# Patient Record
Sex: FEMALE | Race: WHITE | NOT HISPANIC OR LATINO | URBAN - METROPOLITAN AREA
[De-identification: names, ages, dates, MRNs, and addresses within clinical notes are randomized per-mention and may not be internally consistent; named-entity substitution may affect disease eponyms.]

---

## 2017-01-18 ENCOUNTER — APPOINTMENT (RX ONLY)
Dept: URBAN - METROPOLITAN AREA CLINIC 167 | Facility: CLINIC | Age: 52
Setting detail: DERMATOLOGY
End: 2017-01-18

## 2017-01-18 DIAGNOSIS — Z41.9 ENCOUNTER FOR PROCEDURE FOR PURPOSES OTHER THAN REMEDYING HEALTH STATE, UNSPECIFIED: ICD-10-CM

## 2017-01-18 NOTE — HPI: OTHER
Condition:: Filler/btx-a tx
Please Describe Your Condition:: No known contraindications to soft tissue augmentation with CORRINE; no known contraindications to treatment with botulinum toxin. See \"Cosmetic Procedure\" note in Attachments.

## 2017-04-19 ENCOUNTER — APPOINTMENT (RX ONLY)
Dept: URBAN - METROPOLITAN AREA CLINIC 167 | Facility: CLINIC | Age: 52
Setting detail: DERMATOLOGY
End: 2017-04-19

## 2017-04-19 DIAGNOSIS — Z41.9 ENCOUNTER FOR PROCEDURE FOR PURPOSES OTHER THAN REMEDYING HEALTH STATE, UNSPECIFIED: ICD-10-CM

## 2017-07-18 ENCOUNTER — APPOINTMENT (RX ONLY)
Dept: URBAN - METROPOLITAN AREA CLINIC 170 | Facility: CLINIC | Age: 52
Setting detail: DERMATOLOGY
End: 2017-07-18

## 2017-07-18 DIAGNOSIS — Z41.9 ENCOUNTER FOR PROCEDURE FOR PURPOSES OTHER THAN REMEDYING HEALTH STATE, UNSPECIFIED: ICD-10-CM

## 2017-07-18 PROBLEM — E05.90 THYROTOXICOSIS, UNSPECIFIED WITHOUT THYROTOXIC CRISIS OR STORM: Status: ACTIVE | Noted: 2017-07-18

## 2017-07-18 PROCEDURE — ? DYSPORT

## 2017-07-18 PROCEDURE — ? FILLERS

## 2017-07-18 NOTE — PROCEDURE: FILLERS
Dorsal Hands Filler  Volume In Cc: 0
Consent: Written consent obtained. Risks include but not limited to bruising, beading, irregular texture, ulceration, infection, allergic reaction, scar formation, incomplete augmentation, temporary nature, procedural pain.
Additional Area 1 Volume In Cc: 1
Map Statment: See Attach Map for Complete Details
Anesthesia Volume In Cc: 0.5
Post-Care Instructions: Patient instructed to apply ice to reduce swelling.
Additional Area 1 Location: Face
Use Map Statement For Sites (Optional): No
Filler: Juvederm Ultra XC
Expiration Date (Month Year): 08/19
Lot #: [id number]
Detail Level: Zone
Filler: Restylane-L
Lot #: [id number]
Expiration Date (Month Year): 09/18
Additional Anesthesia Volume In Cc: 6
Anesthesia Type: 1% lidocaine without epinephrine

## 2017-07-18 NOTE — PROCEDURE: DYSPORT
Glabellar Complex Units: 0
Consent: Written consent obtained. Risks include but not limited to lid/brow ptosis, bruising, swelling, diplopia, temporary effect, incomplete chemical denervation.
Dilution (U/ 0.1cc): 10
Expiration Date (Month Year): 10/17
Detail Level: Zone
Additional Area 1 Units: 135
Lot #: [id number]
Additional Area 1 Location: Face and neck bands

## 2017-10-10 ENCOUNTER — APPOINTMENT (RX ONLY)
Dept: URBAN - METROPOLITAN AREA CLINIC 170 | Facility: CLINIC | Age: 52
Setting detail: DERMATOLOGY
End: 2017-10-10

## 2017-10-10 DIAGNOSIS — Z41.9 ENCOUNTER FOR PROCEDURE FOR PURPOSES OTHER THAN REMEDYING HEALTH STATE, UNSPECIFIED: ICD-10-CM

## 2017-10-10 PROCEDURE — ? FILLERS

## 2017-10-10 PROCEDURE — ? DYSPORT

## 2017-10-10 NOTE — PROCEDURE: FILLERS
Additional Area 1 Volume In Cc: 0
Expiration Date (Month Year): 09/19
Filler: Restylane-L
Lot #: [id number]
Anesthesia Volume In Cc: 0.5
Additional Area 1 Location: Face
Map Statment: See Attach Map for Complete Details
Additional Anesthesia Volume In Cc: 6
Filler: Juvederm Ultra Plus XC
Detail Level: Zone
Anesthesia Type: 1% lidocaine without epinephrine
Post-Care Instructions: Patient instructed to apply ice to reduce swelling.
Lot #: [id number]
Additional Area 1 Volume In Cc: 1
Use Map Statement For Sites (Optional): No
Expiration Date (Month Year): 11/18
Consent: Written consent obtained. Risks include but not limited to bruising, beading, irregular texture, ulceration, infection, allergic reaction, scar formation, incomplete augmentation, temporary nature, procedural pain.

## 2017-10-10 NOTE — PROCEDURE: DYSPORT
Expiration Date (Month Year): 12/17
Levator Labii Superioris Units: 0
Additional Area 1 Location: Face
Dilution (U/ 0.1cc): 10
Detail Level: Zone
Additional Area 1 Units: 135
Consent: Written consent obtained. Risks include but not limited to lid/brow ptosis, bruising, swelling, diplopia, temporary effect, incomplete chemical denervation.
Lot #: [id number]

## 2018-01-17 ENCOUNTER — APPOINTMENT (RX ONLY)
Dept: URBAN - METROPOLITAN AREA CLINIC 167 | Facility: CLINIC | Age: 53
Setting detail: DERMATOLOGY
End: 2018-01-17

## 2018-01-17 DIAGNOSIS — Z41.9 ENCOUNTER FOR PROCEDURE FOR PURPOSES OTHER THAN REMEDYING HEALTH STATE, UNSPECIFIED: ICD-10-CM

## 2018-01-17 PROCEDURE — ? FILLERS

## 2018-01-17 PROCEDURE — ? DYSPORT

## 2018-01-17 NOTE — PROCEDURE: DYSPORT
Additional Area 3 Units: 0
Detail Level: Zone
Dilution (U/ 0.1cc): 10
Consent: Written consent obtained. Risks include but not limited to lid/brow ptosis, bruising, swelling, diplopia, temporary effect, incomplete chemical denervation.
Additional Area 1 Units: 135
Expiration Date (Month Year): 05/18
Additional Area 1 Location: Face and neck
Lot #: [id number]

## 2018-01-17 NOTE — HPI: OTHER
Condition:: Filler/btx tx
Please Describe Your Condition:: No known contraindications to soft tissue augmentation with CORRINE; no known contraindications to treatment with botulinum toxin. See “Cosmetic Procedure” note in Attachments.

## 2018-01-17 NOTE — PROCEDURE: FILLERS
Lateral Face Filler  Volume In Cc: 0
Consent: Written consent obtained. Risks include but not limited to bruising, beading, irregular texture, ulceration, infection, allergic reaction, scar formation, incomplete augmentation, temporary nature, procedural pain.
Additional Anesthesia Volume In Cc: 6
Additional Area 1 Location: Face
Additional Area 1 Volume In Cc: 0.5
Include Cannula Information In Note?: No
Filler: Juvederm Voluma XC
Expiration Date (Month Year): 06/19
Anesthesia Type: 1% lidocaine without epinephrine
Detail Level: Zone
Lot #: [id number]
Expiration Date (Month Year): 05/19
Filler: Juvederm Volbella XC
Post-Care Instructions: Patient instructed to apply ice to reduce swelling.
Expiration Date (Month Year): 03/19
Map Statment: See Attach Map for Complete Details
Lot #: [id number]
Lot #: [id number]
Additional Area 1 Volume In Cc: 1

## 2018-04-17 ENCOUNTER — APPOINTMENT (RX ONLY)
Dept: URBAN - METROPOLITAN AREA CLINIC 170 | Facility: CLINIC | Age: 53
Setting detail: DERMATOLOGY
End: 2018-04-17

## 2018-04-17 DIAGNOSIS — Z41.9 ENCOUNTER FOR PROCEDURE FOR PURPOSES OTHER THAN REMEDYING HEALTH STATE, UNSPECIFIED: ICD-10-CM

## 2018-04-17 PROCEDURE — ? DYSPORT

## 2018-04-17 PROCEDURE — ? FILLERS

## 2018-04-17 NOTE — PROCEDURE: DYSPORT
Additional Area 1 Location: Face and Neck bands
Detail Level: Zone
Nasal Root Units: 0
Dilution (U/ 0.1cc): 10
Expiration Date (Month Year): 08/18
Additional Area 1 Units: 135
Consent: Written consent obtained. Risks include but not limited to lid/brow ptosis, bruising, swelling, diplopia, temporary effect, incomplete chemical denervation.
Lot #: [id number]

## 2018-04-17 NOTE — PROCEDURE: FILLERS
[place] Filler  Volume In Cc: 0
Detail Level: Zone
Additional Area 1 Volume In Cc: 0.5
Post-Care Instructions: Patient instructed to apply ice to reduce swelling.
Additional Anesthesia Volume In Cc: 6
Consent: Written consent obtained. Risks include but not limited to bruising, beading, irregular texture, ulceration, infection, allergic reaction, scar formation, incomplete augmentation, temporary nature, procedural pain.
Anesthesia Type: 1% lidocaine without epinephrine
Additional Area 1 Volume In Cc: 1
Lot #: [id number]
Expiration Date (Month Year): 05/19
Additional Area 1 Location: Face
Filler: Volbella
Expiration Date (Month Year): 09/19
Expiration Date (Month Year): 04/19
Map Statment: See Attach Map for Complete Details
Lot #: [id number]
Filler: Juvederm Ultra XC
Lot #: [id number]
Include Cannula Information In Note?: No

## 2018-07-02 ENCOUNTER — APPOINTMENT (RX ONLY)
Dept: URBAN - METROPOLITAN AREA CLINIC 167 | Facility: CLINIC | Age: 53
Setting detail: DERMATOLOGY
End: 2018-07-02

## 2018-07-02 DIAGNOSIS — Z41.9 ENCOUNTER FOR PROCEDURE FOR PURPOSES OTHER THAN REMEDYING HEALTH STATE, UNSPECIFIED: ICD-10-CM

## 2018-07-02 PROCEDURE — ? FILLERS

## 2018-07-02 PROCEDURE — ? DYSPORT

## 2018-07-02 NOTE — PROCEDURE: FILLERS
Anesthesia Type: 1% lidocaine without epinephrine
Vermilion Lips Filler Volume In Cc: 0
Additional Area 1 Volume In Cc: 1
Additional Area 1 Location: Face
Expiration Date (Month Year): 05/19
Filler: Juvederm Volbella XC
Consent: Written consent obtained. Risks include but not limited to bruising, beading, irregular texture, ulceration, infection, allergic reaction, scar formation, incomplete augmentation, temporary nature, procedural pain.
Filler: Juvederm Ultra XC
Additional Area 1 Volume In Cc: 0.5
Lot #: [id number]
Detail Level: Zone
Lot #: [id number]
Expiration Date (Month Year): 09/19
Lot #: [id number]
Map Statment: See Attach Map for Complete Details
Use Map Statement For Sites (Optional): No
Additional Anesthesia Volume In Cc: 6
Post-Care Instructions: Patient instructed to apply ice to reduce swelling.

## 2018-07-02 NOTE — PROCEDURE: DYSPORT
Additional Area 6 Units: 0
Expiration Date (Month Year): 12/18
Consent: Written consent obtained. Risks include but not limited to lid/brow ptosis, bruising, swelling, diplopia, temporary effect, incomplete chemical denervation.
Detail Level: Zone
Dilution (U/ 0.1cc): 10
Lot #: [id number]
Additional Area 1 Units: 135
Additional Area 1 Location: Face

## 2018-07-02 NOTE — HPI: OTHER
Condition:: Filler/btx-a treatment
Please Describe Your Condition:: comes in for Filler/btx-a treatment . No known contraindications to soft tissue augmentation with CORRINE; no known contraindications to treatment with botulinum toxin. See “Cosmetic Procedure” note in Attachments.

## 2018-10-04 ENCOUNTER — APPOINTMENT (RX ONLY)
Dept: URBAN - METROPOLITAN AREA CLINIC 170 | Facility: CLINIC | Age: 53
Setting detail: DERMATOLOGY
End: 2018-10-04

## 2018-10-04 DIAGNOSIS — Z41.9 ENCOUNTER FOR PROCEDURE FOR PURPOSES OTHER THAN REMEDYING HEALTH STATE, UNSPECIFIED: ICD-10-CM

## 2018-10-04 PROCEDURE — ? FILLERS

## 2018-10-04 PROCEDURE — ? DYSPORT

## 2018-10-04 NOTE — HPI: OTHER
Condition:: Filler/btx-a tx
Please Describe Your Condition:: comes in for Filler/btx-a tx . No known contraindications to soft tissue augmentation with CORRINE; no known contraindications to treatment with botulinum toxin. See “Cosmetic Procedure” note in Attachments.

## 2018-10-04 NOTE — PROCEDURE: DYSPORT
Additional Area 2 Units: 0
Additional Area 1 Units: 120
Expiration Date (Month Year): 04/19
Detail Level: Zone
Dilution (U/ 0.1cc): 10
Consent: Written consent obtained. Risks include but not limited to lid/brow ptosis, bruising, swelling, diplopia, temporary effect, incomplete chemical denervation.
Lot #: [id number]
Additional Area 1 Location: Face and neck bands

## 2018-10-04 NOTE — PROCEDURE: FILLERS
Lateral Face Filler  Volume In Cc: 0
Include Cannula Information In Note?: No
Expiration Date (Month Year): 10/19
Expiration Date (Month Year): 12/19
Lot #: [id number]
Consent: Written consent obtained. Risks include but not limited to bruising, beading, irregular texture, ulceration, infection, allergic reaction, scar formation, incomplete augmentation, temporary nature, procedural pain.
Map Statment: See Attach Map for Complete Details
Anesthesia Volume In Cc: 0.5
Lot #: [id number]
Post-Care Instructions: Patient instructed to apply ice to reduce swelling.
Additional Area 1 Location: Face
Filler: Juvederm Volbella XC
Additional Anesthesia Volume In Cc: 6
Anesthesia Type: 1% lidocaine without epinephrine
Detail Level: Zone
Filler: Juvederm Voluma XC
Additional Area 1 Volume In Cc: 1
Lot #: [id number]
Expiration Date (Month Year): 01/20

## 2019-01-08 ENCOUNTER — RX ONLY (OUTPATIENT)
Age: 54
Setting detail: RX ONLY
End: 2019-01-08

## 2019-01-08 ENCOUNTER — APPOINTMENT (RX ONLY)
Dept: URBAN - METROPOLITAN AREA CLINIC 170 | Facility: CLINIC | Age: 54
Setting detail: DERMATOLOGY
End: 2019-01-08

## 2019-01-08 DIAGNOSIS — Z41.9 ENCOUNTER FOR PROCEDURE FOR PURPOSES OTHER THAN REMEDYING HEALTH STATE, UNSPECIFIED: ICD-10-CM

## 2019-01-08 PROCEDURE — ? FILLERS

## 2019-01-08 PROCEDURE — ? DYSPORT

## 2019-01-08 RX ORDER — VALACYCLOVIR HYDROCHLORIDE 500 MG/1
TABLET, FILM COATED ORAL
Qty: 20 | Refills: 3 | Status: ERX

## 2019-01-08 NOTE — PROCEDURE: DYSPORT
Detail Level: Zone
Levator Labii Superioris Units: 0
Lot #: [id number]
Dilution (U/ 0.1cc): 10
Expiration Date (Month Year): 07/19
Additional Area 1 Units: 120
Consent: Written consent obtained. Risks include but not limited to lid/brow ptosis, bruising, swelling, diplopia, temporary effect, incomplete chemical denervation.
Additional Area 1 Location: Face and neck bands

## 2019-04-04 ENCOUNTER — APPOINTMENT (RX ONLY)
Dept: URBAN - METROPOLITAN AREA CLINIC 170 | Facility: CLINIC | Age: 54
Setting detail: DERMATOLOGY
End: 2019-04-04

## 2019-04-04 DIAGNOSIS — Z41.9 ENCOUNTER FOR PROCEDURE FOR PURPOSES OTHER THAN REMEDYING HEALTH STATE, UNSPECIFIED: ICD-10-CM

## 2019-04-04 PROCEDURE — ? FILLERS

## 2019-04-04 PROCEDURE — ? BOTOX

## 2019-04-04 PROCEDURE — ? DYSPORT

## 2019-04-04 NOTE — PROCEDURE: FILLERS
Vermilion Lips Filler Volume In Cc: 0
Include Cannula Information In Note?: No
Additional Area 1 Location: Face
Anesthesia Volume In Cc: 0.5
Expiration Date (Month Year): 02/20
Lot #: [id number]
Detail Level: Zone
Filler: Juvederm Voluma XC
Additional Area 1 Volume In Cc: 1
Post-Care Instructions: Patient instructed to apply ice to reduce swelling.
Consent: Written consent obtained. Risks include but not limited to bruising, beading, irregular texture, ulceration, infection, allergic reaction, scar formation, incomplete augmentation, temporary nature, procedural pain.
Additional Anesthesia Volume In Cc: 6
Anesthesia Type: 1% lidocaine without epinephrine
Map Statment: See Attach Map for Complete Details
Lot #: [id number]
Lot #: [id number]
Expiration Date (Month Year): 10/19

## 2019-04-04 NOTE — PROCEDURE: DYSPORT
Expiration Date (Month Year): 09/19
Levator Labii Superioris Units: 0
Additional Area 1 Units: 120
Consent: Written consent obtained. Risks include but not limited to lid/brow ptosis, bruising, swelling, diplopia, temporary effect, incomplete chemical denervation.
Dilution (U/ 0.1cc): 10
Additional Area 1 Location: Face and neck bands
Detail Level: Zone
Lot #: [id number]

## 2019-04-04 NOTE — PROCEDURE: BOTOX
Glabellar Complex Units: 0
Additional Area 1 Units: 5
Expiration Date (Month Year): 05/21
Detail Level: Zone
Dilution (U/0.1 Cc): 4
Lot #: [id number]
Consent: Written consent obtained. Risks include but not limited to lid/brow ptosis, bruising, swelling, diplopia, temporary effect, incomplete chemical denervation.
Additional Area 1 Location: Face

## 2019-06-27 ENCOUNTER — APPOINTMENT (RX ONLY)
Dept: URBAN - METROPOLITAN AREA CLINIC 170 | Facility: CLINIC | Age: 54
Setting detail: DERMATOLOGY
End: 2019-06-27

## 2019-06-27 DIAGNOSIS — Z41.9 ENCOUNTER FOR PROCEDURE FOR PURPOSES OTHER THAN REMEDYING HEALTH STATE, UNSPECIFIED: ICD-10-CM

## 2019-06-27 PROCEDURE — ? BOTOX

## 2019-06-27 PROCEDURE — ? FILLERS

## 2019-06-27 PROCEDURE — ? DYSPORT

## 2019-06-27 NOTE — PROCEDURE: FILLERS
Lateral Face Filler  Volume In Cc: 0
Include Cannula Information In Note?: No
Anesthesia Type: 1% lidocaine without epinephrine
Expiration Date (Month Year): 01/20
Anesthesia Volume In Cc: 0.5
Additional Area 1 Location: Face
Additional Anesthesia Volume In Cc: 6
Additional Area 1 Volume In Cc: 1
Filler: Juvederm Voluma XC
Consent: Written consent obtained. Risks include but not limited to bruising, beading, irregular texture, ulceration, infection, allergic reaction, scar formation, incomplete augmentation, temporary nature, procedural pain.
Detail Level: Zone
Post-Care Instructions: Patient instructed to apply ice to reduce swelling.
Lot #: [id number]
Lot #: [id number]
Lot #: [id number]
Expiration Date (Month Year): 04/20
Map Statment: See Attach Map for Complete Details

## 2019-06-27 NOTE — PROCEDURE: BOTOX
Forehead Units: 0
Additional Area 1 Location: Face
Detail Level: Zone
Consent: Written consent obtained. Risks include but not limited to lid/brow ptosis, bruising, swelling, diplopia, temporary effect, incomplete chemical denervation.
Lot #: [id number]
Additional Area 1 Units: 5
Expiration Date (Month Year): 10/21
Dilution (U/0.1 Cc): 4

## 2019-06-27 NOTE — PROCEDURE: DYSPORT
Inferior Lateral Orbicularis Oculi Units: 0
Dilution (U/ 0.1cc): 10
Additional Area 1 Units: 120
Detail Level: Zone
Expiration Date (Month Year): 10/19
Consent: Written consent obtained. Risks include but not limited to lid/brow ptosis, bruising, swelling, diplopia, temporary effect, incomplete chemical denervation.
Lot #: [id number]
Additional Area 1 Location: Face and neck bands

## 2019-10-03 ENCOUNTER — APPOINTMENT (RX ONLY)
Dept: URBAN - METROPOLITAN AREA CLINIC 170 | Facility: CLINIC | Age: 54
Setting detail: DERMATOLOGY
End: 2019-10-03

## 2019-10-03 ENCOUNTER — RX ONLY (OUTPATIENT)
Age: 54
Setting detail: RX ONLY
End: 2019-10-03

## 2019-10-03 DIAGNOSIS — Z41.9 ENCOUNTER FOR PROCEDURE FOR PURPOSES OTHER THAN REMEDYING HEALTH STATE, UNSPECIFIED: ICD-10-CM

## 2019-10-03 PROCEDURE — ? DYSPORT

## 2019-10-03 PROCEDURE — ? BOTOX

## 2019-10-03 PROCEDURE — ? FILLERS

## 2019-10-03 RX ORDER — LIDOCAINE AND PRILOCAINE CREAM 25; 25 MG/G; MG/G
CREAM TOPICAL
Qty: 1 | Refills: 3 | Status: ERX | COMMUNITY
Start: 2019-10-03

## 2019-10-03 NOTE — PROCEDURE: BOTOX
Additional Area 2 Units: 0
Show Depressor Anguli Units: Yes
Consent: Written consent obtained. Risks include but not limited to lid/brow ptosis, bruising, swelling, diplopia, temporary effect, incomplete chemical denervation.
Show Lcl Units: No
Dilution (U/0.1 Cc): 4
Detail Level: Zone
Additional Area 1 Location: Face
Expiration Date (Month Year): 2/22
Additional Area 1 Units: 5
Lot #: [id number]

## 2019-10-03 NOTE — PROCEDURE: DYSPORT
Forehead Units: 0
Lot #: [id number]
Additional Area 1 Location: face and neck bands
Additional Area 1 Units: 120
Expiration Date (Month Year): 3/20
Detail Level: Zone
Consent: Written consent obtained. Risks include but not limited to lid/brow ptosis, bruising, swelling, diplopia, temporary effect, incomplete chemical denervation.
Dilution (U/ 0.1cc): 10

## 2019-10-03 NOTE — PROCEDURE: FILLERS
Consent: Written consent obtained. Risks include but not limited to bruising, beading, irregular texture, ulceration, infection, allergic reaction, scar formation, incomplete augmentation, temporary nature, procedural pain.
Additional Area 5 Volume In Cc: 0
Additional Area 1 Location: Face
Additional Area 1 Volume In Cc: 0.5
Post-Care Instructions: Patient instructed to apply ice to reduce swelling.
Additional Anesthesia Volume In Cc: 6
Additional Area 1 Volume In Cc: 1
Include Cannula Information In Note?: No
Filler: Juvederm Volbella XC
Lot #: [id number]
Filler: Juvederm Ultra Plus XC
Expiration Date (Month Year): 01/20
Detail Level: Zone
Lot #: [id number]
Expiration Date (Month Year): 6/24/20
Map Statment: See Attach Map for Complete Details
Lot #: [id number]
Expiration Date (Month Year): 8/5/20
Anesthesia Type: 1% lidocaine without epinephrine

## 2020-01-14 ENCOUNTER — APPOINTMENT (RX ONLY)
Dept: URBAN - METROPOLITAN AREA CLINIC 173 | Facility: CLINIC | Age: 55
Setting detail: DERMATOLOGY
End: 2020-01-14

## 2020-01-14 DIAGNOSIS — Z41.9 ENCOUNTER FOR PROCEDURE FOR PURPOSES OTHER THAN REMEDYING HEALTH STATE, UNSPECIFIED: ICD-10-CM

## 2020-01-14 PROCEDURE — ? FILLERS

## 2020-01-14 PROCEDURE — ? BOTOX

## 2020-01-14 PROCEDURE — ? DYSPORT

## 2020-01-14 NOTE — PROCEDURE: FILLERS
Nasolabial Folds Filler Volume In Cc: 0
Include Cannula Information In Note?: No
Lot #: [id number]
Map Statment: See Attach Map for Complete Details
Lot #: [id number]
Lot #: [id number]
Filler: Juvederm Ultra Plus XC
Expiration Date (Month Year): 01/20
Expiration Date (Month Year): 6/24/20
Expiration Date (Month Year): 8/14/20
Detail Level: Zone
Additional Anesthesia Volume In Cc: 6
Post-Care Instructions: Patient instructed to apply ice to reduce swelling.
Additional Area 1 Location: Face
Anesthesia Volume In Cc: 0.5
Consent: Written consent obtained. Risks include but not limited to bruising, beading, irregular texture, ulceration, infection, allergic reaction, scar formation, incomplete augmentation, temporary nature, procedural pain.
Anesthesia Type: 1% lidocaine without epinephrine
Additional Area 1 Volume In Cc: 1
Filler: Juvederm Volbella XC

## 2020-01-14 NOTE — PROCEDURE: BOTOX
Lateral Platysmal Bands Units: 0
Show Lcl Units: No
Detail Level: Zone
Dilution (U/0.1 Cc): 4
Show Lateral Platysmal Band Units: Yes
Additional Area 1 Location: Face
Additional Area 1 Units: 5
Expiration Date (Month Year): 6/22
Consent: Written consent obtained. Risks include but not limited to lid/brow ptosis, bruising, swelling, diplopia, temporary effect, incomplete chemical denervation.
Lot #: [id number]

## 2020-01-14 NOTE — PROCEDURE: DYSPORT
Lateral Platysmal Bands Units: 0
Consent: Written consent obtained. Risks include but not limited to lid/brow ptosis, bruising, swelling, diplopia, temporary effect, incomplete chemical denervation.
Additional Area 1 Units: 120
Dilution (U/ 0.1cc): 10
Expiration Date (Month Year): 7/20
Detail Level: Zone
Lot #: [id number]
Additional Area 1 Location: Neck bands

## 2020-01-14 NOTE — HPI: OTHER
Condition:: Filler/btx-a treatment
Please Describe Your Condition:: comes in for Filler/btx-a treatment . No known contraindications to soft tissue augmentation with CORRINE See “Cosmetic Procedure” note in attachments.

## 2020-05-13 ENCOUNTER — APPOINTMENT (RX ONLY)
Dept: URBAN - METROPOLITAN AREA CLINIC 173 | Facility: CLINIC | Age: 55
Setting detail: DERMATOLOGY
End: 2020-05-13

## 2020-05-13 DIAGNOSIS — Z41.9 ENCOUNTER FOR PROCEDURE FOR PURPOSES OTHER THAN REMEDYING HEALTH STATE, UNSPECIFIED: ICD-10-CM

## 2020-05-13 PROCEDURE — ? FILLERS

## 2020-05-13 PROCEDURE — ? DYSPORT

## 2020-05-13 PROCEDURE — ? BOTOX

## 2020-05-13 NOTE — PROCEDURE: FILLERS
Additional Area 3 Volume In Cc: 0
Additional Anesthesia Volume In Cc: 6
Additional Area 1 Location: Face
Additional Area 1 Volume In Cc: 1
Lot #: [id number]
Detail Level: Zone
Filler: Juvederm Ultra Plus XC
Expiration Date (Month Year): 01/20
Filler: Juvederm Volbella XC
Include Cannula Information In Note?: No
Lot #: [id number]
Consent: Written consent obtained. Risks include but not limited to bruising, beading, irregular texture, ulceration, infection, allergic reaction, scar formation, incomplete augmentation, temporary nature, procedural pain.
Expiration Date (Month Year): 08/20
Post-Care Instructions: Patient instructed to apply ice to reduce swelling.
Map Statment: See Attach Map for Complete Details
Lot #: [id number]
Anesthesia Type: 1% lidocaine without epinephrine
Expiration Date (Month Year): 12/20
Anesthesia Volume In Cc: 0.5

## 2020-05-13 NOTE — PROCEDURE: DYSPORT
Show Depressor Anguli Units: Yes
Expiration Date (Month Year): 10/20
Show Right And Left Brow Units: No
Additional Area 2 Units: 0
Dilution (U/ 0.1cc): 10
Lot #: [id number]
Additional Area 1 Location: face and neck bands
Consent: Written consent obtained. Risks include but not limited to lid/brow ptosis, bruising, swelling, diplopia, temporary effect, incomplete chemical denervation.
Detail Level: Zone

## 2020-05-13 NOTE — HPI: OTHER
Condition:: Filler/btx-a tx
Please Describe Your Condition:: is being seen for Filler/btx-a tx . No known contraindications to soft tissue augmentation with CORRINE; no known contraindications to treatment with botulinum toxin. See “Cosmetic Procedure”note in Attachments.

## 2020-10-06 ENCOUNTER — APPOINTMENT (RX ONLY)
Dept: URBAN - METROPOLITAN AREA CLINIC 173 | Facility: CLINIC | Age: 55
Setting detail: DERMATOLOGY
End: 2020-10-06

## 2020-10-06 DIAGNOSIS — Z41.9 ENCOUNTER FOR PROCEDURE FOR PURPOSES OTHER THAN REMEDYING HEALTH STATE, UNSPECIFIED: ICD-10-CM

## 2020-10-06 PROCEDURE — ? DYSPORT

## 2020-10-06 PROCEDURE — ? BOTOX

## 2020-10-06 PROCEDURE — ? FILLERS

## 2020-10-06 NOTE — PROCEDURE: BOTOX
Additional Area 3 Units: 0
Show Right And Left Brow Units: No
Show Anterior Platysmal Band Units: Yes
Additional Area 1 Units: 5
Additional Area 1 Location: Face
Expiration Date (Month Year): 05/23
Detail Level: Zone
Lot #: [id number]
Dilution (U/0.1 Cc): 4
Consent: Written consent obtained. Risks include but not limited to lid/brow ptosis, bruising, swelling, diplopia, temporary effect, incomplete chemical denervation.

## 2020-10-06 NOTE — PROCEDURE: FILLERS
Lateral Face Filler  Volume In Cc: 0
Include Cannula Information In Note?: No
Expiration Date (Month Year): 01/20
Consent: Written consent obtained. Risks include but not limited to bruising, beading, irregular texture, ulceration, infection, allergic reaction, scar formation, incomplete augmentation, temporary nature, procedural pain.
Anesthesia Volume In Cc: 0.5
Post-Care Instructions: Patient instructed to apply ice to reduce swelling.
Additional Anesthesia Volume In Cc: 6
Additional Area 1 Location: Face
Additional Area 1 Volume In Cc: 1
Filler: Juvederm Ultra Plus XC
Filler: Juvederm Volbella XC
Detail Level: Zone
Map Statment: See Attach Map for Complete Details
Lot #: [id number]
Lot #: [id number]
Lot #: [id number]
Expiration Date (Month Year): 08/21
Expiration Date (Month Year): 06/21
Anesthesia Type: 1% lidocaine without epinephrine

## 2020-10-06 NOTE — PROCEDURE: DYSPORT
Anterior Platysmal Bands Units: 0
Show Topical Anesthesia: Yes
Show Lcl Units: No
Expiration Date (Month Year): 02/21
Detail Level: Zone
Lot #: [id number]
Additional Area 1 Location: Face and Neckbands
Dilution (U/ 0.1cc): 10
Consent: Written consent obtained. Risks include but not limited to lid/brow ptosis, bruising, swelling, diplopia, temporary effect, incomplete chemical denervation.
Additional Area 1 Units: 120

## 2020-12-18 NOTE — PROCEDURE: FILLERS
Detail Level: Zone
Expiration Date (Month Year): 02/20
Lateral Face Filler  Volume In Cc: 0
Include Cannula Information In Note?: No
Lot #: [id number]
Lot #: [id number]
Map Statment: See Attach Map for Complete Details
Additional Area 1 Location: Face
Expiration Date (Month Year): 06/20
Anesthesia Type: 1% lidocaine without epinephrine
Anesthesia Volume In Cc: 0.5
Consent: Written consent obtained. Risks include but not limited to bruising, beading, irregular texture, ulceration, infection, allergic reaction, scar formation, incomplete augmentation, temporary nature, procedural pain.
Post-Care Instructions: Patient instructed to apply ice to reduce swelling.
Additional Area 1 Volume In Cc: 1
Additional Anesthesia Volume In Cc: 6
Lot #: [id number]
Filler: Juvederm Voluma XC
Filler: Juvederm Volbella XC
Expiration Date (Month Year): 10/19
160.02

## 2021-01-14 ENCOUNTER — APPOINTMENT (RX ONLY)
Dept: URBAN - METROPOLITAN AREA CLINIC 173 | Facility: CLINIC | Age: 56
Setting detail: DERMATOLOGY
End: 2021-01-14

## 2021-01-14 DIAGNOSIS — Z41.9 ENCOUNTER FOR PROCEDURE FOR PURPOSES OTHER THAN REMEDYING HEALTH STATE, UNSPECIFIED: ICD-10-CM

## 2021-01-14 PROCEDURE — ? FILLERS

## 2021-01-14 PROCEDURE — ? BOTOX

## 2021-01-14 PROCEDURE — ? DYSPORT

## 2021-01-14 NOTE — PROCEDURE: BOTOX
[facility] Units: 0
Show Right And Left Brow Units: No
Show Additional Area 3: Yes
Additional Area 1 Location: Face
Dilution (U/0.1 Cc): 4
Consent: Written consent obtained. Risks include but not limited to lid/brow ptosis, bruising, swelling, diplopia, temporary effect, incomplete chemical denervation.
Lot #: [id number]
Detail Level: Zone
Expiration Date (Month Year): 08/23
Additional Area 1 Units: 5

## 2021-01-14 NOTE — PROCEDURE: DYSPORT
Show Additional Area 6: Yes
Right Pupillary Line Units: 0
Additional Area 1 Units: 120
Show Right And Left Periorbital Units: No
Additional Area 1 Location: Face
Dilution (U/ 0.1cc): 10
Lot #: [id number]
Consent: Written consent obtained. Risks include but not limited to lid/brow ptosis, bruising, swelling, diplopia, temporary effect, incomplete chemical denervation.
Expiration Date (Month Year): 06/21
Detail Level: Zone

## 2021-01-14 NOTE — PROCEDURE: FILLERS
Additional Area 4 Volume In Cc: 0
Consent: Written consent obtained. Risks include but not limited to bruising, beading, irregular texture, ulceration, infection, allergic reaction, scar formation, incomplete augmentation, temporary nature, procedural pain.
Anesthesia Volume In Cc: 0.5
Include Cannula Information In Note?: No
Lot #: [id number] *JENNIFER Special *
Expiration Date (Month Year): 02/22
Anesthesia Type: 1% lidocaine without epinephrine
Lot #: [id number]
Expiration Date (Month Year): 01/21
Lot #: [id number]
Expiration Date (Month Year): 01/20
Map Statment: See Attach Map for Complete Details
Filler: Restylane Refyne
Additional Area 1 Location: Face
Additional Area 1 Volume In Cc: 1
Filler: Juvederm Voluma XC
Detail Level: Zone
Additional Anesthesia Volume In Cc: 6
Post-Care Instructions: Patient instructed to apply ice to reduce swelling.

## 2021-05-03 ENCOUNTER — APPOINTMENT (RX ONLY)
Dept: URBAN - METROPOLITAN AREA CLINIC 173 | Facility: CLINIC | Age: 56
Setting detail: DERMATOLOGY
End: 2021-05-03

## 2021-05-03 DIAGNOSIS — Z41.9 ENCOUNTER FOR PROCEDURE FOR PURPOSES OTHER THAN REMEDYING HEALTH STATE, UNSPECIFIED: ICD-10-CM

## 2021-05-03 PROCEDURE — ? DYSPORT

## 2021-05-03 PROCEDURE — ? HYALURONIDASE INJECTION

## 2021-05-03 PROCEDURE — ? BOTOX

## 2021-05-03 ASSESSMENT — LOCATION ZONE DERM: LOCATION ZONE: FACE

## 2021-05-03 ASSESSMENT — LOCATION SIMPLE DESCRIPTION DERM
LOCATION SIMPLE: RIGHT CHEEK
LOCATION SIMPLE: LEFT CHEEK

## 2021-05-03 ASSESSMENT — LOCATION DETAILED DESCRIPTION DERM
LOCATION DETAILED: RIGHT CENTRAL MALAR CHEEK
LOCATION DETAILED: LEFT CENTRAL MALAR CHEEK

## 2021-05-03 NOTE — PROCEDURE: HYALURONIDASE INJECTION
Total Units (Leave Blank If Undesired): 40
Total Volume (Ccs): 0.2
Hyaluronidase Preparation: hyaluronidase
Detail Level: Zone
Lot # (Optional): [id number]
Consent: The risks of contour defects and dimpling of the skin were reviewed with the patient prior to the injection.
Expiration Date (Optional): 09/21, 04/22

## 2021-05-03 NOTE — HPI: OTHER
Condition:: Hylenex treatment
Please Describe Your Condition:: is being seen for Hylenex treatment . See “Cosmetic Procedure”note in Attachments.
Condition:: Btx-a tx
Please Describe Your Condition:: is being seen for Btx-a tx . No known contraindications to treatment with botulinum toxin. See “Cosmetic Procedure”note in Attachments.

## 2021-05-03 NOTE — PROCEDURE: DYSPORT
Glabellar Complex Units: 0
Detail Level: Zone
Show Orbicularis Oculi Units: Yes
Additional Area 1 Location: Face and neckbands
Show Right And Left Periorbital Units: No
Additional Area 1 Units: 120
Consent: Written consent obtained. Risks include but not limited to lid/brow ptosis, bruising, swelling, diplopia, temporary effect, incomplete chemical denervation.
Expiration Date (Month Year): 11/21
Lot #: [id number]
Dilution (U/ 0.1cc): 10

## 2021-05-03 NOTE — PROCEDURE: BOTOX
Additional Area 4 Units: 0
Show Periorbital Units: Yes
Show Mentalis Units: No
Detail Level: Zone
Consent: Written consent obtained. Risks include but not limited to lid/brow ptosis, bruising, swelling, diplopia, temporary effect, incomplete chemical denervation.
Lot #: [id number]
Additional Area 1 Location: Face
Dilution (U/0.1 Cc): 4
Expiration Date (Month Year): 07/23
Additional Area 1 Units: 5

## 2021-05-12 ENCOUNTER — APPOINTMENT (RX ONLY)
Dept: URBAN - METROPOLITAN AREA CLINIC 173 | Facility: CLINIC | Age: 56
Setting detail: DERMATOLOGY
End: 2021-05-12

## 2021-05-12 DIAGNOSIS — Z41.9 ENCOUNTER FOR PROCEDURE FOR PURPOSES OTHER THAN REMEDYING HEALTH STATE, UNSPECIFIED: ICD-10-CM

## 2021-05-12 PROCEDURE — ? HYALURONIDASE INJECTION

## 2021-05-12 PROCEDURE — ? FILLERS

## 2021-05-12 ASSESSMENT — LOCATION DETAILED DESCRIPTION DERM: LOCATION DETAILED: RIGHT CHIN

## 2021-05-12 ASSESSMENT — LOCATION SIMPLE DESCRIPTION DERM: LOCATION SIMPLE: CHIN

## 2021-05-12 ASSESSMENT — LOCATION ZONE DERM: LOCATION ZONE: FACE

## 2021-05-12 NOTE — PROCEDURE: FILLERS
Lot #: [id number] *JENNIFER Special *
Vermilion Lips Filler Volume In Cc: 0
Lot #: [id number]
Map Statment: See Attach Map for Complete Details
Consent: Written consent obtained. Risks include but not limited to bruising, beading, irregular texture, ulceration, infection, allergic reaction, scar formation, incomplete augmentation, temporary nature, procedural pain.
Lot #: [id number]
Expiration Date (Month Year): 01/20
Expiration Date (Month Year): 10/21
Anesthesia Type: 1% lidocaine without epinephrine
Expiration Date (Month Year): 08/23
Include Cannula Information In Note?: No
Anesthesia Volume In Cc: 0.5
Additional Area 1 Location: Face
Additional Anesthesia Volume In Cc: 6
Additional Area 1 Volume In Cc: 1
Additional Area 1 Volume In Cc: 2
Filler: Juvederm Ultra XC
Detail Level: Zone
Filler: RHA 3
Post-Care Instructions: Patient instructed to apply ice to reduce swelling.

## 2021-05-12 NOTE — HPI: OTHER
Condition:: Filler tx
Please Describe Your Condition:: is being seen for Filler tx . No known contraindications to soft tissue augmentation with CORRINE. See “Cosmetic Procedure”note in Attachments.
Condition:: Hylenex treatment
Please Describe Your Condition:: is being seen for Hylenex treatment : Right chin. See “Cosmetic Procedure”note in Attachments. Lot# [id number], Exp. 09/2021.

## 2021-05-12 NOTE — PROCEDURE: HYALURONIDASE INJECTION
Hyaluronidase Preparation: hyaluronidase
Total Volume (Ccs): 0.2
Lot # (Optional): [id number]
Detail Level: Zone
Expiration Date (Optional): 07/2021
Consent: The risks of contour defects and dimpling of the skin were reviewed with the patient prior to the injection.

## 2021-09-14 ENCOUNTER — APPOINTMENT (RX ONLY)
Dept: URBAN - METROPOLITAN AREA CLINIC 173 | Facility: CLINIC | Age: 56
Setting detail: DERMATOLOGY
End: 2021-09-14

## 2021-09-14 DIAGNOSIS — Z41.9 ENCOUNTER FOR PROCEDURE FOR PURPOSES OTHER THAN REMEDYING HEALTH STATE, UNSPECIFIED: ICD-10-CM

## 2021-09-14 PROCEDURE — ? FILLERS

## 2021-09-14 PROCEDURE — ? BOTOX

## 2021-09-14 PROCEDURE — ? DYSPORT

## 2021-09-14 NOTE — PROCEDURE: DYSPORT
Right Periorbital Units: 0
Show Additional Area 6: Yes
Detail Level: Zone
Lot #: [id number]
Additional Area 1 Location: Face and neckbands
Show Right And Left Pupillary Line Units: No
Consent: Written consent obtained. Risks include but not limited to lid/brow ptosis, bruising, swelling, diplopia, temporary effect, incomplete chemical denervation.
Dilution (U/ 0.1cc): 10
Additional Area 1 Units: 120
Expiration Date (Month Year): 03/22

## 2021-09-14 NOTE — PROCEDURE: BOTOX
[facility] Units: 0
Show Lcl Units: No
Show Additional Area 5: Yes
Additional Area 1 Location: Face
Expiration Date (Month Year): 11/23
Additional Area 1 Units: 5
Lot #: [id number]
Detail Level: Zone
Dilution (U/0.1 Cc): 4
Consent: Written consent obtained. Risks include but not limited to lid/brow ptosis, bruising, swelling, diplopia, temporary effect, incomplete chemical denervation.

## 2021-09-14 NOTE — PROCEDURE: FILLERS
Additional Area 1 Volume In Cc: 0
Detail Level: Zone
Additional Area 1 Location: Face
Additional Area 1 Volume In Cc: 2
Consent: Written consent obtained. Risks include but not limited to bruising, beading, irregular texture, ulceration, infection, allergic reaction, scar formation, incomplete augmentation, temporary nature, procedural pain.
Use Map Statement For Sites (Optional): No
Post-Care Instructions: Patient instructed to apply ice to reduce swelling.
Filler: RHA 3
Filler: Juvederm Ultra XC
Map Statment: See Attach Map for Complete Details
Anesthesia Type: 1% lidocaine without epinephrine
Lot #: [id number]
Anesthesia Volume In Cc: 0.5
Lot #: [id number] *JENNIFER Special *
Lot #: [id number]
Expiration Date (Month Year): 11/21
Expiration Date (Month Year): 02/24
Expiration Date (Month Year): 01/20
Additional Anesthesia Volume In Cc: 6

## 2021-12-22 ENCOUNTER — APPOINTMENT (RX ONLY)
Dept: URBAN - METROPOLITAN AREA CLINIC 173 | Facility: CLINIC | Age: 56
Setting detail: DERMATOLOGY
End: 2021-12-22

## 2021-12-22 DIAGNOSIS — Z41.9 ENCOUNTER FOR PROCEDURE FOR PURPOSES OTHER THAN REMEDYING HEALTH STATE, UNSPECIFIED: ICD-10-CM

## 2021-12-22 PROCEDURE — ? BOTOX

## 2021-12-22 PROCEDURE — ? DYSPORT

## 2021-12-22 PROCEDURE — ? FILLERS

## 2021-12-22 NOTE — PROCEDURE: BOTOX
Right Pupillary Line Units: 0
Show Depressor Anguli Units: Yes
Show Right And Left Periorbital Units: No
Detail Level: Zone
Lot #: [id number]
Expiration Date (Month Year): 04/24
Dilution (U/0.1 Cc): 4
Additional Area 1 Location: Face
Consent: Written consent obtained. Risks include but not limited to lid/brow ptosis, bruising, swelling, diplopia, temporary effect, incomplete chemical denervation.
Additional Area 1 Units: 5

## 2021-12-22 NOTE — PROCEDURE: DYSPORT
Show Mentalis Units: No
Show Nasal Units: Yes
Inferior Lateral Orbicularis Oculi Units: 0
Detail Level: Zone
Lot #: [id number]
Dilution (U/ 0.1cc): 10
Consent: Written consent obtained. Risks include but not limited to lid/brow ptosis, bruising, swelling, diplopia, temporary effect, incomplete chemical denervation.
Additional Area 1 Location: Face and neckbands
Expiration Date (Month Year): 08/22
Additional Area 1 Units: 120

## 2021-12-22 NOTE — PROCEDURE: FILLERS
Jawline Filler Volume In Cc: 0
Filler: Juvederm Ultra XC
Filler: RHA 3
Detail Level: Zone
Use Map Statement For Sites (Optional): No
Expiration Date (Month Year): 01/20
Map Statment: See Attach Map for Complete Details
Lot #: [id number] *JENNIFER Special *
Lot #: [id number]
Lot #: [id number]
Expiration Date (Month Year): 03/24
Anesthesia Type: 1% lidocaine without epinephrine
Expiration Date (Month Year): 02/22
Anesthesia Volume In Cc: 0.5
Additional Anesthesia Volume In Cc: 6
Additional Area 1 Location: Face
Additional Area 1 Volume In Cc: 1
Additional Area 1 Volume In Cc: 2
Consent: Written consent obtained. Risks include but not limited to bruising, beading, irregular texture, ulceration, infection, allergic reaction, scar formation, incomplete augmentation, temporary nature, procedural pain.
Post-Care Instructions: Patient instructed to apply ice to reduce swelling.

## 2022-03-14 ENCOUNTER — APPOINTMENT (RX ONLY)
Dept: URBAN - METROPOLITAN AREA CLINIC 173 | Facility: CLINIC | Age: 57
Setting detail: DERMATOLOGY
End: 2022-03-14

## 2022-03-14 DIAGNOSIS — Z029 ENCOUNTERS FOR UNSPECIFIED ADMINISTRATIVE PURPOSE: ICD-10-CM

## 2022-03-14 DIAGNOSIS — Z41.9 ENCOUNTER FOR PROCEDURE FOR PURPOSES OTHER THAN REMEDYING HEALTH STATE, UNSPECIFIED: ICD-10-CM

## 2022-03-14 PROCEDURE — ? HYALURONIDASE INJECTION

## 2022-03-14 PROCEDURE — ? FILLERS

## 2022-03-14 PROCEDURE — ? DYSPORT

## 2022-03-14 PROCEDURE — ? BOTOX

## 2022-03-14 ASSESSMENT — LOCATION DETAILED DESCRIPTION DERM: LOCATION DETAILED: LEFT CENTRAL MALAR CHEEK

## 2022-03-14 ASSESSMENT — LOCATION SIMPLE DESCRIPTION DERM: LOCATION SIMPLE: LEFT CHEEK

## 2022-03-14 ASSESSMENT — LOCATION ZONE DERM: LOCATION ZONE: FACE

## 2022-03-14 NOTE — PROCEDURE: DYSPORT
Right Periorbital Units: 0
Show Nasal Units: Yes
Lot #: [id number]
Show Mentalis Units: No
Detail Level: Zone
Dilution (U/ 0.1cc): 10
Consent: Written consent obtained. Risks include but not limited to lid/brow ptosis, bruising, swelling, diplopia, temporary effect, incomplete chemical denervation.
Additional Area 1 Location: Face and neckbands
Additional Area 1 Units: 120
Expiration Date (Month Year): 09/22

## 2022-03-14 NOTE — PROCEDURE: HYALURONIDASE INJECTION
Lot # (Optional): [id number]
Total Units (Leave Blank If Undesired): 30
Detail Level: Detailed
Expiration Date (Optional): 05/2024
Consent: The risks of contour defects and dimpling of the skin were reviewed with the patient prior to the injection.
Hyaluronidase Preparation: hyaluronidase

## 2022-03-14 NOTE — PROCEDURE: BOTOX
Show Additional Area 2: Yes
Lateral Platysmal Bands Units: 0
Dilution (U/0.1 Cc): 4
Additional Area 1 Location: Face
Consent: Written consent obtained. Risks include but not limited to lid/brow ptosis, bruising, swelling, diplopia, temporary effect, incomplete chemical denervation.
Show Right And Left Pupillary Line Units: No
Additional Area 1 Units: 5
Detail Level: Zone
Lot #: [id number]
Expiration Date (Month Year): 05/24

## 2022-03-14 NOTE — HPI: OTHER
Condition:: Hylenex treatment
Please Describe Your Condition:: is being seen for a Hylenex treatment . Treatment area: Left cheek. Lot# [id number], EXP. 05/2024. See “Cosmetic Procedure Note” in attachments.

## 2022-03-14 NOTE — PROCEDURE: FILLERS
Additional Area 4 Volume In Cc: 0
Use Map Statement For Sites (Optional): No
Lot #: [id number]
Map Statment: See Attach Map for Complete Details
Lot #: [id number] *JENNIFER Special *
Additional Area 1 Location: Face
Expiration Date (Month Year): 07/12/2024
Anesthesia Type: 1% lidocaine without epinephrine
Expiration Date (Month Year): 09/29/2022
Anesthesia Volume In Cc: 0.5
Additional Anesthesia Volume In Cc: 6
Additional Area 1 Volume In Cc: 1
Additional Area 1 Volume In Cc: 2
Lot #: [id number]
Consent: Written consent obtained. Risks include but not limited to bruising, beading, irregular texture, ulceration, infection, allergic reaction, scar formation, incomplete augmentation, temporary nature, procedural pain.
Post-Care Instructions: Patient instructed to apply ice to reduce swelling.
Filler: RHA 3
Expiration Date (Month Year): 01/20
Filler: Juvederm Ultra XC
Detail Level: Zone

## 2022-03-15 ENCOUNTER — APPOINTMENT (RX ONLY)
Dept: URBAN - METROPOLITAN AREA CLINIC 173 | Facility: CLINIC | Age: 57
Setting detail: DERMATOLOGY
End: 2022-03-15

## 2022-03-15 DIAGNOSIS — Z41.9 ENCOUNTER FOR PROCEDURE FOR PURPOSES OTHER THAN REMEDYING HEALTH STATE, UNSPECIFIED: ICD-10-CM

## 2022-03-15 PROCEDURE — ? HYALURONIDASE INJECTION

## 2022-03-15 ASSESSMENT — LOCATION SIMPLE DESCRIPTION DERM: LOCATION SIMPLE: LEFT CHEEK

## 2022-03-15 ASSESSMENT — LOCATION ZONE DERM: LOCATION ZONE: FACE

## 2022-03-15 ASSESSMENT — LOCATION DETAILED DESCRIPTION DERM: LOCATION DETAILED: LEFT CENTRAL MALAR CHEEK

## 2022-03-15 NOTE — HPI: OTHER
Condition:: Cosmetic follow up Hylenex treatment 3/14/3021
Please Describe Your Condition:: is being seen for Cosmetic follow up of Hylenex treatment done 3/14/3021.

## 2022-03-15 NOTE — PROCEDURE: HYALURONIDASE INJECTION
Detail Level: Simple
Consent: The risks of contour defects and dimpling of the skin were reviewed with the patient prior to the injection.
Hyaluronidase Preparation: hyaluronidase
Total Volume (Ccs): 0.2

## 2022-03-16 ENCOUNTER — APPOINTMENT (RX ONLY)
Dept: URBAN - METROPOLITAN AREA CLINIC 173 | Facility: CLINIC | Age: 57
Setting detail: DERMATOLOGY
End: 2022-03-16

## 2022-03-16 DIAGNOSIS — Z029 ENCOUNTERS FOR UNSPECIFIED ADMINISTRATIVE PURPOSE: ICD-10-CM

## 2022-03-16 PROCEDURE — ? PATIENT SPECIFIC COUNSELING

## 2022-03-16 NOTE — HPI: OTHER
Condition:: Cosmetic follow up
Please Describe Your Condition:: is being seen for a Cosmetic follow up . Assessment post Hylenex and Vitrase 03/15/22 treatments .\\nPatient reports resolution of pain! No altered sensation or motor dysfunction; no skin symptoms. Mild bruising with minimal discomfort on left lateral lower cheek. She is very happy to feel so much better. No other new s/sx.

## 2022-03-16 NOTE — PROCEDURE: PATIENT SPECIFIC COUNSELING
Detail Level: Zone
Left cheek has shown some improvement. Patient was advised to continue with medication through [facility] 3/20/22. Additional medication *samples were given. Detailed instructions are as follows: \\nPrednisone - W: 40mg, TH: 20mg, Fri: 20mg, *Sat: 20mg, *Sun: 20mg.\\nAspirin (325mg) take x 2 bid. *Qty:16 given (4 additional days of asa given).

## 2022-04-27 ENCOUNTER — APPOINTMENT (RX ONLY)
Dept: URBAN - METROPOLITAN AREA CLINIC 173 | Facility: CLINIC | Age: 57
Setting detail: DERMATOLOGY
End: 2022-04-27

## 2022-04-27 VITALS — SYSTOLIC BLOOD PRESSURE: 109 MMHG | HEART RATE: 89 BPM | DIASTOLIC BLOOD PRESSURE: 71 MMHG

## 2022-04-27 DIAGNOSIS — Z41.9 ENCOUNTER FOR PROCEDURE FOR PURPOSES OTHER THAN REMEDYING HEALTH STATE, UNSPECIFIED: ICD-10-CM

## 2022-04-27 PROCEDURE — ? FILLERS

## 2022-04-27 NOTE — PROCEDURE: FILLERS
Vermilion Lips Filler Volume In Cc: 0
Cheeks Filler Volume In Cc: 1
Anesthesia Type: 1% lidocaine without epinephrine
Anesthesia Volume In Cc: 0.2
Lot #: [id number]* special
Include Cannula Information In Note?: No
Lot #: [id number]
Lot #: [id number]*special
Expiration Date (Month Year): 8/31/23
Expiration Date (Month Year): 7/3/23
Additional Anesthesia Volume In Cc: 6
Expiration Date (Month Year): 01/20
Include Cannula Information In Note?: Yes
Include Cannula Size?: 25G
Filler: Antonio Devine
Detail Level: Zone
Additional Area 1 Location: Face
Include Cannula Length?: 1.5 inch
Consent: Written consent obtained. Risks include but not limited to bruising, beading, irregular texture, ulceration, infection, allergic reaction, scar formation, incomplete augmentation, temporary nature, procedural pain.
Post-Care Instructions: Patient instructed to apply ice to reduce swelling.
Map Statment: See Attach Map for Complete Details
Filler: RHA 2

## 2022-06-01 ENCOUNTER — APPOINTMENT (RX ONLY)
Dept: URBAN - METROPOLITAN AREA CLINIC 173 | Facility: CLINIC | Age: 57
Setting detail: DERMATOLOGY
End: 2022-06-01

## 2022-06-01 DIAGNOSIS — Z41.9 ENCOUNTER FOR PROCEDURE FOR PURPOSES OTHER THAN REMEDYING HEALTH STATE, UNSPECIFIED: ICD-10-CM

## 2022-06-01 PROCEDURE — ? FILLERS

## 2022-06-01 NOTE — PROCEDURE: FILLERS
Vermilion Lips Filler Volume In Cc: 0
Aspiration Statement: Aspiration was performed prior to injecting site with filler.
Lot #: [id number] *JENNIFER SPECIAL*
Lot #: [id number] *NATEKS Special *
Expiration Date (Month Year): 08/31/24
Expiration Date (Month Year): 06/12/23
Anesthesia Type: 1% lidocaine without epinephrine
Include Cannula Information In Note?: No
Anesthesia Volume In Cc: 0.5
Lot #: [id number]
Expiration Date (Month Year): 01/20
Additional Anesthesia Volume In Cc: 6
Additional Area 1 Location: Face
Topical Anesthesia?: 30% lidocaine, plasticized base
Additional Area 1 Volume In Cc: 1
Detail Level: Zone
Filler: Antonio Devine
Filler: RHA 2
Consent: Written consent obtained. Risks include but not limited to bruising, beading, irregular texture, ulceration, infection, allergic reaction, scar formation, incomplete augmentation, temporary nature, procedural pain.
Map Statment: See Attach Map for Complete Details
Post-Care Instructions: Patient instructed to apply ice to reduce swelling.

## 2022-09-29 ENCOUNTER — APPOINTMENT (RX ONLY)
Dept: URBAN - METROPOLITAN AREA CLINIC 173 | Facility: CLINIC | Age: 57
Setting detail: DERMATOLOGY
End: 2022-09-29

## 2022-09-29 DIAGNOSIS — Z41.9 ENCOUNTER FOR PROCEDURE FOR PURPOSES OTHER THAN REMEDYING HEALTH STATE, UNSPECIFIED: ICD-10-CM

## 2022-09-29 DIAGNOSIS — L57.8 OTHER SKIN CHANGES DUE TO CHRONIC EXPOSURE TO NONIONIZING RADIATION: ICD-10-CM

## 2022-09-29 PROCEDURE — ? COSMETIC CONSULTATION: BOTULINUM TOXIN

## 2022-09-29 PROCEDURE — ? COSMETIC CONSULTATION: FILLERS

## 2022-09-29 PROCEDURE — ? DYSPORT

## 2022-09-29 PROCEDURE — ? TREATMENT REGIMEN

## 2022-09-29 PROCEDURE — ? PRESCRIPTION

## 2022-09-29 RX ORDER — PHARMACY COMPOUNDING ACCESSORY
EACH MISCELLANEOUS
Qty: 30 | Refills: 3 | Status: ERX | COMMUNITY
Start: 2022-09-29

## 2022-09-29 RX ORDER — LIDOCAINE AND PRILOCAINE 25; 25 MG/G; MG/G
CREAM TOPICAL
Qty: 30 | Refills: 3 | Status: ERX

## 2022-09-29 RX ADMIN — Medication: at 00:00

## 2022-09-29 NOTE — PROCEDURE: TREATMENT REGIMEN
Samples Given: MD Skin Essentials LUIS ENRIQUE Lotion, LUIS ENRIQUE Cream, Skin Better Alto Defense Serum, Elta 50 Sport, MDSkin Essentials Silk Shield 40 spf
Plan: Recommended regimen \\nAM: \\ngentle cleanser\\nAlto Defese Serum or continue CE Ferrulic \\nMoisturizer LUIS ENRIQUE Lotion or  patient preference \\nSilk Shield spf 40\\n*\\nPM:\\nContinue dermalogica cleanser\\nadd Tretinoin compounded 0.025% 3x a week increasing to nightly as tolerated\\nMD Skin Essentials LUIS ENRIQUE Cream
Detail Level: Simple

## 2022-09-29 NOTE — PROCEDURE: DYSPORT
Forehead Units: 0
Dilution (U/ 0.1cc): 10
Show Forehead Units: Yes
Show Right And Left Pupillary Line Units: No
Additional Area 3 Location: Perioral
Expiration Date (Month Year): 10/31/22
Price (Use Numbers Only, No Special Characters Or $): 105
Detail Level: Zone
Additional Area 1 Location: face
Consent: Written consent obtained. Risks include but not limited to lid/brow ptosis, bruising, swelling, diplopia, temporary effect, incomplete chemical denervation.
Additional Area 1 Units: 126
Post-Care Instructions: Patient instructed to not lie down for 4 hours and limit physical activity for 24 hours. Patient instructed not to travel by airplane for 48 hours.
Additional Area 2 Location: neck
Lot #: [id number]

## 2022-10-26 ENCOUNTER — APPOINTMENT (RX ONLY)
Dept: URBAN - METROPOLITAN AREA CLINIC 173 | Facility: CLINIC | Age: 57
Setting detail: DERMATOLOGY
End: 2022-10-26

## 2022-10-26 DIAGNOSIS — Z41.9 ENCOUNTER FOR PROCEDURE FOR PURPOSES OTHER THAN REMEDYING HEALTH STATE, UNSPECIFIED: ICD-10-CM

## 2022-10-26 PROCEDURE — ? FILLERS

## 2022-10-26 PROCEDURE — ? BOTOX

## 2022-10-26 NOTE — PROCEDURE: FILLERS
Lot #: [id number]
Filler: RHA 2
Additional Area 3 Location: chin
Vermilion Lips Filler Volume In Cc: 0
Additional Area 1 Volume In Cc: 1
Map Statment: See Attach Map for Complete Details
Additional Area 2 Location: ear lobes
Expiration Date (Month Year): 08/31/2020
Filler: RHA 3
Include Documentation That Aspiration Was Performed Prior To Injecting Filler:: No
Include Cannula Size?: 25G
Include Cannula Information In Note?: Yes
Vermilion Lips Filler Volume In Cc: 0.2
Aspiration Statement: Aspiration was performed prior to injecting site with filler.
Include Cannula Length?: 1.5 inch
Lot #: [id number]
Consent: Written consent obtained. Risks include but not limited to bruising, beading, irregular texture, ulceration, infection, allergic reaction, scar formation, incomplete augmentation, temporary nature, procedural pain.
Post-Care Instructions: Patient instructed to apply ice to reduce swelling.
Anesthesia Type: 1% lidocaine with epinephrine and a 1:10 solution of 8.4% sodium bicarbonate
Expiration Date (Month Year): 12/1/24
Additional Area 1 Location: face
Anesthesia Volume In Cc: 0.7
Lot #: [id number]
Additional Area 1 Volume In Cc: 0.8
Expiration Date (Month Year): 3/18/25
Detail Level: Zone
Topical Anesthesia?: 2.5% lidocaine, 2.5% prilocaine
Additional Area 2 Location: Hands
Price (Use Numbers Only, No Special Characters Or $): 2233

## 2022-10-26 NOTE — HPI: COSMETIC (FILLERS)
Have You Had Fillers Before?: has had fillers
Additional History: *patient should avoid Voluma XC filler per hx.
When Was Your Last Filler Injection?: 06/01/22

## 2022-10-26 NOTE — PROCEDURE: BOTOX
Show Right And Left Pupillary Line Units: No
Additional Area 6 Units: 0
Show Orbicularis Oculi Units: Yes
Detail Level: Zone
Additional Area 1 Units: 0.5
Additional Area 2 Location: Upper cutaneous lip
Expiration Date (Month Year): 11/24
Additional Area 3 Location: masseters
Lot #: [id number] * SI special *
Dilution (U/0.1 Cc): 1
Consent: Written consent obtained. Risks include but not limited to lid/brow ptosis, bruising, swelling, diplopia, temporary effect, incomplete chemical denervation.
Post-Care Instructions: Patient instructed to not lie down for 4 hours and limit physical activity for 24 hours. Patient instructed not to travel by airplane for 48 hours.
Additional Area 1 Location: Face

## 2023-02-01 ENCOUNTER — APPOINTMENT (RX ONLY)
Dept: URBAN - METROPOLITAN AREA CLINIC 173 | Facility: CLINIC | Age: 58
Setting detail: DERMATOLOGY
End: 2023-02-01

## 2023-02-01 DIAGNOSIS — Z41.9 ENCOUNTER FOR PROCEDURE FOR PURPOSES OTHER THAN REMEDYING HEALTH STATE, UNSPECIFIED: ICD-10-CM

## 2023-02-01 PROCEDURE — ? DYSPORT

## 2023-02-01 NOTE — PROCEDURE: DYSPORT
Show Right And Left Brow Units: No
Show Depressor Anguli Units: Yes
Expiration Date (Month Year): 07/23
[facility] Units: 0
Price (Use Numbers Only, No Special Characters Or $): 009
Additional Area 1 Location: face
Consent: Written consent obtained. Risks include but not limited to lid/brow ptosis, bruising, swelling, diplopia, temporary effect, incomplete chemical denervation.
Post-Care Instructions: Patient instructed to not lie down for 4 hours and limit physical activity for 24 hours. Patient instructed not to travel by airplane for 48 hours.
Additional Area 1 Units: 125
Additional Area 2 Location: neck
Lot #: [id number]
Detail Level: Zone
Dilution (U/ 0.1cc): 10
Additional Area 3 Location: Perioral

## 2023-03-02 ENCOUNTER — APPOINTMENT (RX ONLY)
Dept: URBAN - METROPOLITAN AREA CLINIC 173 | Facility: CLINIC | Age: 58
Setting detail: DERMATOLOGY
End: 2023-03-02

## 2023-03-02 DIAGNOSIS — Z41.9 ENCOUNTER FOR PROCEDURE FOR PURPOSES OTHER THAN REMEDYING HEALTH STATE, UNSPECIFIED: ICD-10-CM

## 2023-03-02 PROCEDURE — ? FILLERS

## 2023-03-02 PROCEDURE — ? DYSPORT

## 2023-03-02 NOTE — PROCEDURE: DYSPORT
Right Pupillary Line Units: 0
Show Right And Left Pupillary Line Units: No
Detail Level: Zone
Expiration Date (Month Year): 7/23
Show Depressor Anguli Units: Yes
Price (Use Numbers Only, No Special Characters Or $): 108
Additional Area 1 Location: face
Consent: Written consent obtained. Risks include but not limited to lid/brow ptosis, bruising, swelling, diplopia, temporary effect, incomplete chemical denervation.
Post-Care Instructions: Patient instructed to not lie down for 4 hours and limit physical activity for 24 hours. Patient instructed not to travel by airplane for 48 hours.
Additional Area 1 Units: 24
Additional Area 2 Location: neck
Lot #: [id number]
Additional Area 3 Location: Perioral
Dilution (U/ 0.1cc): 10
Health  - [facility]IP[facility]

## 2023-03-02 NOTE — PROCEDURE: FILLERS
Include Cannula Information In Note?: Yes
Additional Area 2 Volume In Cc: 0
Additional Area 1 Location: Face
Include Cannula Length?: 1.5 inch
Additional Area 1 Volume In Cc: 0.1
Additional Area 3 Location: chin
Additional Area 2 Location: ear lobes
Detail Level: Zone
Topical Anesthesia?: 2.5% lidocaine, 2.5% prilocaine
Filler: Restylane Contour
Include Cannula Size?: 25G
Price (Use Numbers Only, No Special Characters Or $): 1359
Use Map Statement For Sites (Optional): No
Filler: RHA 3
Cheeks Filler Volume In Cc: 0.9
Lot #: [id number]
Map Statment: See Attach Map for Complete Details
Expiration Date (Month Year): 05/02/25
Lot #: [id number]
Aspiration Statement: Aspiration was performed prior to injecting site with filler.
Consent: Written consent obtained. Risks include but not limited to bruising, beading, irregular texture, ulceration, infection, allergic reaction, scar formation, incomplete augmentation, temporary nature, procedural pain.
Expiration Date (Month Year): 10/31/23
Post-Care Instructions: Patient instructed to apply ice to reduce swelling.
Anesthesia Type: 1% lidocaine with epinephrine and a 1:10 solution of 8.4% sodium bicarbonate
Lot #: [id number]
Anesthesia Volume In Cc: 0.6
Additional Area 1 Volume In Cc: 1
Expiration Date (Month Year): 08/31/2020
Additional Area 2 Location: Hands

## 2023-06-13 ENCOUNTER — APPOINTMENT (RX ONLY)
Dept: URBAN - METROPOLITAN AREA CLINIC 173 | Facility: CLINIC | Age: 58
Setting detail: DERMATOLOGY
End: 2023-06-13

## 2023-06-13 DIAGNOSIS — Z41.9 ENCOUNTER FOR PROCEDURE FOR PURPOSES OTHER THAN REMEDYING HEALTH STATE, UNSPECIFIED: ICD-10-CM

## 2023-06-13 PROCEDURE — ? DYSPORT

## 2023-06-13 NOTE — PROCEDURE: DYSPORT
Show Topical Anesthesia: Yes
Additional Area 2 Location: neck
Show Lcl Units: No
Additional Area 6 Units: 0
Lot #: [id number]
Dilution (U/ 0.1cc): 10
Additional Area 3 Location: Perioral
Detail Level: Zone
Price (Use Numbers Only, No Special Characters Or $): 781
Expiration Date (Month Year): 11/23
Consent: Written consent obtained. Risks include but not limited to lid/brow ptosis, bruising, swelling, diplopia, temporary effect, incomplete chemical denervation.
Additional Area 1 Location: face
Additional Area 1 Units: 162
Post-Care Instructions: Patient instructed to not lie down for 4 hours and limit physical activity for 24 hours. Patient instructed not to travel by airplane for 48 hours.

## 2024-02-11 NOTE — HPI: OTHER
Condition:: Filler/btx-a tx
Please Describe Your Condition:: No known contraindications to soft tissue augmentation with CORRINE; no known contraindications to treatment with botulinum toxin. See \"Cosmetic Procedure\" note in Attachments.
Alert and oriented to person, place and time

## 2024-02-29 ENCOUNTER — APPOINTMENT (RX ONLY)
Dept: URBAN - METROPOLITAN AREA CLINIC 173 | Facility: CLINIC | Age: 59
Setting detail: DERMATOLOGY
End: 2024-02-29

## 2024-02-29 DIAGNOSIS — I78.8 OTHER DISEASES OF CAPILLARIES: ICD-10-CM

## 2024-02-29 DIAGNOSIS — Z41.9 ENCOUNTER FOR PROCEDURE FOR PURPOSES OTHER THAN REMEDYING HEALTH STATE, UNSPECIFIED: ICD-10-CM

## 2024-02-29 DIAGNOSIS — L72.0 EPIDERMAL CYST: ICD-10-CM

## 2024-02-29 PROCEDURE — ? OTHER (COSMETIC)

## 2024-02-29 PROCEDURE — ? DYSPORT

## 2024-02-29 PROCEDURE — ? ACNE SURGERY COSMETIC

## 2024-02-29 ASSESSMENT — LOCATION SIMPLE DESCRIPTION DERM
LOCATION SIMPLE: LEFT CHEEK
LOCATION SIMPLE: LEFT THIGH
LOCATION SIMPLE: RIGHT THIGH

## 2024-02-29 ASSESSMENT — LOCATION ZONE DERM
LOCATION ZONE: LEG
LOCATION ZONE: FACE

## 2024-02-29 ASSESSMENT — LOCATION DETAILED DESCRIPTION DERM
LOCATION DETAILED: RIGHT ANTERIOR PROXIMAL THIGH
LOCATION DETAILED: LEFT ANTERIOR PROXIMAL THIGH
LOCATION DETAILED: LEFT MEDIAL MALAR CHEEK

## 2024-02-29 NOTE — PROCEDURE: ACNE SURGERY COSMETIC
Prep Text (Optional): Prior to removal the treatment areas were prepped in the usual fashion.
Consent was obtained and risks were reviewed including but not limited to scarring, infection, bleeding, scabbing, incomplete removal, and allergy to anesthesia.
Acne Type: Milial cysts
Price (Use Numbers Only, No Special Characters Or $): 0
Detail Level: Simple
Render Number Of Lesions Treated: yes
Post-Care Instructions: I reviewed with the patient in detail post-care instructions. Patient is to keep the treatment areaas dry overnight, and then apply bacitracin twice daily until healed. Patient may apply hydrogen peroxide soaks to remove any crusting.
Extraction Method: comedo extractor
Render Post-Care Instructions In Note?: no

## 2024-02-29 NOTE — PROCEDURE: DYSPORT
L Brow Units: 0
Show Additional Area 6: Yes
Detail Level: Zone
Consent: Written consent obtained. Risks include but not limited to lid/brow ptosis, bruising, swelling, diplopia, temporary effect, incomplete chemical denervation.
Show Ucl Units: No
Post-Care Instructions: Patient instructed to not lie down for 4 hours and limit physical activity for 24 hours. Patient instructed not to travel by airplane for 48 hours.
Additional Area 1 Units: 158
Additional Area 1 Location: face
Additional Area 2 Location: neck
Lot #: [id number]
Dilution (U/0.1 Cc): 10
Additional Area 3 Location: Perioral
Expiration Date (Month Year): 8/31/25
Price (Use Numbers Only, No Special Characters Or $): 052

## 2024-02-29 NOTE — PROCEDURE: OTHER (COSMETIC)
Other (Free Text): Discussed sclerotherapy for treatment of unwanted LE vessels. Referred patient to [facility]. Photos of legs were obtained today. Told patient that a series of 2-3 treatments are recommended and quoted $400-$600 per treatment.
Detail Level: Simple

## 2024-03-27 ENCOUNTER — APPOINTMENT (RX ONLY)
Dept: URBAN - METROPOLITAN AREA CLINIC 159 | Facility: CLINIC | Age: 59
Setting detail: DERMATOLOGY
End: 2024-03-27

## 2024-03-27 DIAGNOSIS — L82.1 OTHER SEBORRHEIC KERATOSIS: ICD-10-CM

## 2024-03-27 DIAGNOSIS — D18.0 HEMANGIOMA: ICD-10-CM

## 2024-03-27 PROBLEM — D18.01 HEMANGIOMA OF SKIN AND SUBCUTANEOUS TISSUE: Status: ACTIVE | Noted: 2024-03-27

## 2024-03-27 PROCEDURE — 99212 OFFICE O/P EST SF 10 MIN: CPT

## 2024-03-27 ASSESSMENT — LOCATION SIMPLE DESCRIPTION DERM
LOCATION SIMPLE: RIGHT UPPER BACK
LOCATION SIMPLE: ABDOMEN
LOCATION SIMPLE: CHEST

## 2024-03-27 ASSESSMENT — LOCATION DETAILED DESCRIPTION DERM
LOCATION DETAILED: MIDDLE STERNUM
LOCATION DETAILED: RIGHT INFERIOR MEDIAL UPPER BACK
LOCATION DETAILED: RIGHT SUPERIOR MEDIAL UPPER BACK
LOCATION DETAILED: PERIUMBILICAL SKIN

## 2024-03-27 ASSESSMENT — LOCATION ZONE DERM: LOCATION ZONE: TRUNK

## 2024-03-27 NOTE — HPI: FULL BODY SKIN EXAMINATION
How Severe Are Your Spot(S)?: moderate
What Type Of Note Output Would You Prefer (Optional)?: Bullet Format
What Is The Reason For Today's Visit?: Full Body Skin Examination
What Is The Reason For Today's Visit? (Being Monitored For X): the development of new lesions
Additional History: Has history of excessive sun exposure.

## 2024-03-27 NOTE — PROCEDURE: MIPS QUALITY
Detail Level: Detailed
Quality 226: Preventive Care And Screening: Tobacco Use: Screening And Cessation Intervention: Tobacco Screening not Performed
Quality 394a: Meningococcal Immunizations For Adolescents: Patient had one dose of meningococcal vaccine (serogroups A, C, W, Y) on or between the patient's 11th and 13th birthdays.

## 2024-04-02 ENCOUNTER — APPOINTMENT (RX ONLY)
Dept: URBAN - METROPOLITAN AREA CLINIC 173 | Facility: CLINIC | Age: 59
Setting detail: DERMATOLOGY
End: 2024-04-02

## 2024-04-02 DIAGNOSIS — I83.9 ASYMPTOMATIC VARICOSE VEINS OF LOWER EXTREMITIES: ICD-10-CM

## 2024-04-02 PROBLEM — I83.93 ASYMPTOMATIC VARICOSE VEINS OF BILATERAL LOWER EXTREMITIES: Status: ACTIVE | Noted: 2024-04-02

## 2024-04-02 PROCEDURE — ? SCLEROTHERAPY (COSMETIC)

## 2024-04-02 ASSESSMENT — LOCATION ZONE DERM: LOCATION ZONE: LEG

## 2024-04-02 ASSESSMENT — LOCATION SIMPLE DESCRIPTION DERM
LOCATION SIMPLE: RIGHT THIGH
LOCATION SIMPLE: LEFT THIGH

## 2024-04-02 ASSESSMENT — LOCATION DETAILED DESCRIPTION DERM
LOCATION DETAILED: RIGHT ANTERIOR DISTAL THIGH
LOCATION DETAILED: LEFT ANTERIOR DISTAL THIGH

## 2024-04-02 NOTE — PROCEDURE: SCLEROTHERAPY (COSMETIC)
Lot # A: [id number]
Post-Care Instructions: Compression for 3 weeks is critical to optimize your recovery and results. Compliance with compression helps to prevent blood clots and minimizes pain, swelling, bruising, skin discoloration (staining) and the recurrence of vessels.  Materials to gather for your wound care (all available over the counter):  Compression stockings x 2 pairs, 4 x 4 gauze, Comprilan wrap: 8 cm and 10 cm width wrap, Medical adhesive tape. Compression and Wound care;  Leg compression for 3 weeks is leyva to your success and safety. Compression at night is only needed the first day. After that, compression is needed only during waking hours.  However, if your leg feels better with compression at night, then you may continue compression at night as tolerated.  After the sclerotherapy procedure, 2 layers compression will be placed.  1. On the skin, folded or flat gauze will cover the treated areas. 2. A compression wrap (Comprilan) will be wrapped around your leg over the gauze. Once the compression wrap is in place, a compression stocking will be worn. This two layer  compression (wrap plus stocking) should be worn for the first 24 hours if tolerated.       3. After 24 hours, remove all compressions and dressings and just wear the compression stockings only during waking hours.  You will need to wear compression stockings for three weeks after your procedure, unless your physician instructs you otherwise.  Activity:  It is important NOT to be sitting or lying down for several hours after surgery. You may begin walking immediately after surgery. This is good for you, but take it easy.  For 2 days after treatment: Avoid aerobic exercise, weight training, and all other types of exertion that increase your breathing and pulse rates.  Do not get a tan for one month after sclerotherapy. Tanning increases your risk of skin discoloration. Bathing: After 24 hours, you may shower or bathe in tepid water, but keep the compression stocking on. Avoid immersion in hot tubs.      For pain or discomfort: You may take acetaminophen (TylenolTM, Extra-Strength TylenolTM or DatrilTM) as directed. Do not use aspirin, products containing aspirin, or ibuprofen (for example AdvilTM or MotrinTM) for five days after your surgery, unless approved by your physician.       Dietary restrictions: Do not drink alcoholic beverages for two days after your sclerotherapy procedure. Possible side effects following sclerotherapy:  After sclerotherapy, mild swelling is expected. The injection sites may also become bruised or gray. You may also experience one or more of the following side effects, which almost always go away within one to four months:  Darkening of the injected veins.  Brownish staining of the skin.  Small clotted vessels under the surface of the skin that you can feel.  Bruising of the injection sites:   What to do about bruising - This will resolve within 2-3 weeks. If you wish the bruising to disappear sooner, then applying Arnicare cream (over the counter, health food stores) will help.  What to do if you feel small clotted vessels under the surface of the skin:  Call us for a follow up appointment. These small clots can be drained through a small nick. Draining these small clots will help you heal faster and with less discoloration. Contact your physician if you experience any of the following:  Treated areas become increasingly sore, tender, red or warm.  Acetaminophen does not relieve your discomfort. Injection sites turn black or the skin around the site breaks down. Ulceration of the injection sites. You develop blisters from the tape. You develop significant swelling or pain in the leg. Darkening of large areas of the skin or foot.
Number Of Injections (Sclerosant 1): 0
Treatment Number (Optional): 1
Disposition: Compression stockings were advised for post op therapy. Clobetasol applied to treated areas
Detail Level: Detailed
Consent was obtained with risks, benefits, and alternatives discussed for the above procedures.  Photographs were taken.
Expiration Date A (Month Year): 10/25
Sclerosant Volume (Cc): 10
Price (Use Numbers Only, No Special Characters Or $): 436
Sclerosant Volume (Cc): 2

## 2024-06-10 NOTE — PROCEDURE: BOTOX
Additional Area 4 Units: 0
Show Right And Left Pupillary Line Units: No
Show Additional Area 2: Yes
Consent: Written consent obtained. Risks include but not limited to lid/brow ptosis, bruising, swelling, diplopia, temporary effect, incomplete chemical denervation.
Additional Area 1 Units: 5
Detail Level: Zone
Additional Area 1 Location: Face
Expiration Date (Month Year): 09/22
Dilution (U/0.1 Cc): 4
Lot #: [id number]
4 = No assist / stand by assistance

## 2024-07-03 ENCOUNTER — APPOINTMENT (RX ONLY)
Dept: URBAN - METROPOLITAN AREA CLINIC 173 | Facility: CLINIC | Age: 59
Setting detail: DERMATOLOGY
End: 2024-07-03

## 2024-07-03 DIAGNOSIS — Z41.9 ENCOUNTER FOR PROCEDURE FOR PURPOSES OTHER THAN REMEDYING HEALTH STATE, UNSPECIFIED: ICD-10-CM

## 2024-07-03 PROCEDURE — ? DYSPORT

## 2024-07-03 PROCEDURE — ? FILLERS

## 2024-07-03 NOTE — PROCEDURE: DYSPORT
Levator Labii Superioris Units: 0
Show Orbicularis Oculi Units: Yes
Dilution (U/0.1 Cc): 10
Detail Level: Zone
Additional Area 3 Location: Perioral
Show Right And Left Pupillary Line Units: No
Expiration Date (Month Year): 11/30/25
Price (Use Numbers Only, No Special Characters Or $): 952
Consent: Written consent obtained. Risks include but not limited to lid/brow ptosis, bruising, swelling, diplopia, temporary effect, incomplete chemical denervation.
Additional Area 1 Location: face
Post-Care Instructions: Patient instructed to not lie down for 4 hours and limit physical activity for 24 hours. Patient instructed not to travel by airplane for 48 hours.
Additional Area 1 Units: 145
Additional Area 2 Location: neck
Lot #: [id number]

## 2024-09-11 NOTE — HPI: OTHER
FRANCO Baez, called to discuss the patient's irrigation solution.    Please call Sasha at [phone].  
Condition:: Filler/btx tx
Please Describe Your Condition:: No known contraindications to soft tissue augmentation with CORRINE; no known contraindications to treatment with botulinum toxin. See “Cosmetic Procedure” note in Attachments.

## 2024-09-30 ENCOUNTER — APPOINTMENT (RX ONLY)
Dept: URBAN - METROPOLITAN AREA CLINIC 173 | Facility: CLINIC | Age: 59
Setting detail: DERMATOLOGY
End: 2024-09-30

## 2024-09-30 DIAGNOSIS — I83.9 ASYMPTOMATIC VARICOSE VEINS OF LOWER EXTREMITIES: ICD-10-CM

## 2024-09-30 PROBLEM — I83.93 ASYMPTOMATIC VARICOSE VEINS OF BILATERAL LOWER EXTREMITIES: Status: ACTIVE | Noted: 2024-09-30

## 2024-09-30 PROCEDURE — ? DIAGNOSIS COMMENT

## 2024-09-30 PROCEDURE — ? SCLEROTHERAPY (COSMETIC)

## 2024-09-30 ASSESSMENT — LOCATION DETAILED DESCRIPTION DERM
LOCATION DETAILED: LEFT DISTAL POSTERIOR THIGH
LOCATION DETAILED: RIGHT DISTAL CALF
LOCATION DETAILED: RIGHT ANTERIOR DISTAL THIGH
LOCATION DETAILED: RIGHT DISTAL PRETIBIAL REGION
LOCATION DETAILED: RIGHT DISTAL POSTERIOR THIGH
LOCATION DETAILED: LEFT ANTERIOR DISTAL THIGH
LOCATION DETAILED: LEFT DISTAL CALF
LOCATION DETAILED: LEFT DISTAL PRETIBIAL REGION

## 2024-09-30 ASSESSMENT — LOCATION SIMPLE DESCRIPTION DERM
LOCATION SIMPLE: RIGHT CALF
LOCATION SIMPLE: RIGHT POSTERIOR THIGH
LOCATION SIMPLE: RIGHT PRETIBIAL REGION
LOCATION SIMPLE: LEFT PRETIBIAL REGION
LOCATION SIMPLE: LEFT THIGH
LOCATION SIMPLE: RIGHT THIGH
LOCATION SIMPLE: LEFT CALF
LOCATION SIMPLE: LEFT POSTERIOR THIGH

## 2024-09-30 ASSESSMENT — LOCATION ZONE DERM: LOCATION ZONE: LEG

## 2024-09-30 NOTE — PROCEDURE: DIAGNOSIS COMMENT
Render Risk Assessment In Note?: no
Detail Level: Simple
Comment: Patient presents with some PIH on previous treated areas along course of a few veins. Educated that this is a normal response and will fade with time. Instructed to apply sunscreen daily and could consider brightening cream in future if needed. Patient given a sample of topical clobetasol and was instructed to apply to treated areas daily until she runs out. \\n\\nDiscussed 1 more treatment for the series. After will assess if more additional are needed. \\nRN sclero $400 / treatment, patient given pricing

## 2024-09-30 NOTE — PROCEDURE: SCLEROTHERAPY (COSMETIC)
Lot # [id number]: [id number]
Post-Care Instructions: Compression for 3 weeks is critical to optimize your recovery and results. Compliance with compression helps to prevent blood clots and minimizes pain, swelling, bruising, skin discoloration (staining) and the recurrence of vessels.  Materials to gather for your wound care (all available over the counter):  Compression stockings x 2 pairs, 4 x 4 gauze, Comprilan wrap: 8 cm and 10 cm width wrap, Medical adhesive tape. Compression and Wound care;  Leg compression for 3 weeks is leyva to your success and safety. Compression at night is only needed the first day. After that, compression is needed only during waking hours.  However, if your leg feels better with compression at night, then you may continue compression at night as tolerated.  After the sclerotherapy procedure, 2 layers compression will be placed.  1. On the skin, folded or flat gauze will cover the treated areas. 2. A compression wrap (Comprilan) will be wrapped around your leg over the gauze. Once the compression wrap is in place, a compression stocking will be worn. This two layer  compression (wrap plus stocking) should be worn for the first 24 hours if tolerated.       3. After 24 hours, remove all compressions and dressings and just wear the compression stockings only during waking hours.  You will need to wear compression stockings for three weeks after your procedure, unless your physician instructs you otherwise.  Activity:  It is important NOT to be sitting or lying down for several hours after surgery. You may begin walking immediately after surgery. This is good for you, but take it easy.  For 2 days after treatment: Avoid aerobic exercise, weight training, and all other types of exertion that increase your breathing and pulse rates.  Do not get a tan for one month after sclerotherapy. Tanning increases your risk of skin discoloration. Bathing: After 24 hours, you may shower or bathe in tepid water, but keep the compression stocking on. Avoid immersion in hot tubs.      For pain or discomfort: You may take acetaminophen (TylenolTM, Extra-Strength TylenolTM or DatrilTM) as directed. Do not use aspirin, products containing aspirin, or ibuprofen (for example AdvilTM or MotrinTM) for five days after your surgery, unless approved by your physician.       Dietary restrictions: Do not drink alcoholic beverages for two days after your sclerotherapy procedure. Possible side effects following sclerotherapy:  After sclerotherapy, mild swelling is expected. The injection sites may also become bruised or gray. You may also experience one or more of the following side effects, which almost always go away within one to four months:  Darkening of the injected veins.  Brownish staining of the skin.  Small clotted vessels under the surface of the skin that you can feel.  Bruising of the injection sites:   What to do about bruising - This will resolve within 2-3 weeks. If you wish the bruising to disappear sooner, then applying Arnicare cream (over the counter, health food stores) will help.  What to do if you feel small clotted vessels under the surface of the skin:  Call us for a follow up appointment. These small clots can be drained through a small nick. Draining these small clots will help you heal faster and with less discoloration. Contact your physician if you experience any of the following:  Treated areas become increasingly sore, tender, red or warm.  Acetaminophen does not relieve your discomfort. Injection sites turn black or the skin around the site breaks down. Ulceration of the injection sites. You develop blisters from the tape. You develop significant swelling or pain in the leg. Darkening of large areas of the skin or foot.
Number Of Injections (Sclerosant 1): 0
Treatment Number (Optional): 2
Disposition: Compression stockings were advised for post op therapy. Clobetasol applied to treated areas. Patient given sample of Clobetasol and directed to apply to treated areas for the next few days.
Detail Level: Detailed
Consent was obtained with risks, benefits, and alternatives discussed for the above procedures.  Photographs were taken.
Expiration Date A (Month Year): 03/2026
Sclerosant Volume (Cc): 10
Price (Use Numbers Only, No Special Characters Or $): 169

## 2024-11-01 ENCOUNTER — APPOINTMENT (RX ONLY)
Dept: URBAN - METROPOLITAN AREA CLINIC 173 | Facility: CLINIC | Age: 59
Setting detail: DERMATOLOGY
End: 2024-11-01

## 2024-11-01 DIAGNOSIS — Z41.9 ENCOUNTER FOR PROCEDURE FOR PURPOSES OTHER THAN REMEDYING HEALTH STATE, UNSPECIFIED: ICD-10-CM

## 2024-11-01 PROCEDURE — ? DYSPORT

## 2024-11-01 NOTE — PROCEDURE: DYSPORT
Depressor Anguli Oris Units: 0
Show Additional Area 5: Yes
Show Lcl Units: No
Additional Area 2 Location: neck
Detail Level: Zone
Lot #: [id number]
Dilution (U/0.1 Cc): 10
Additional Area 3 Location: Perioral
Expiration Date (Month Year): 4-30-26
Price (Use Numbers Only, No Special Characters Or $): 831
Consent: Written consent obtained. Risks include but not limited to lid/brow ptosis, bruising, swelling, diplopia, temporary effect, incomplete chemical denervation.
Additional Area 1 Location: face
Post-Care Instructions: Patient instructed to not lie down for 4 hours and limit physical activity for 24 hours. Patient instructed not to travel by airplane for 48 hours.
Additional Area 1 Units: 146

## 2024-12-27 ENCOUNTER — APPOINTMENT (OUTPATIENT)
Dept: URBAN - METROPOLITAN AREA CLINIC 173 | Facility: CLINIC | Age: 59
Setting detail: DERMATOLOGY
End: 2024-12-27

## 2024-12-27 DIAGNOSIS — Z41.9 ENCOUNTER FOR PROCEDURE FOR PURPOSES OTHER THAN REMEDYING HEALTH STATE, UNSPECIFIED: ICD-10-CM

## 2024-12-27 PROCEDURE — ? DYSPORT

## 2024-12-27 NOTE — PROCEDURE: DYSPORT
Depressor Anguli Oris Units: 0
Show Additional Area 5: Yes
Show Lcl Units: No
Additional Area 2 Location: neck
Detail Level: Zone
Lot #: [id number]
Dilution (U/0.1 Cc): 10
Additional Area 3 Location: Perioral
Expiration Date (Month Year): 06/30/2026
Price (Use Numbers Only, No Special Characters Or $): 150
Consent: Written consent obtained. Risks include but not limited to lid/brow ptosis, bruising, swelling, diplopia, temporary effect, incomplete chemical denervation.
Additional Area 1 Location: face
Post-Care Instructions: Patient instructed to not lie down for 4 hours and limit physical activity for 24 hours. Patient instructed not to travel by airplane for 48 hours.
Additional Area 1 Units: 12

## 2025-03-25 ENCOUNTER — APPOINTMENT (OUTPATIENT)
Dept: URBAN - METROPOLITAN AREA CLINIC 173 | Facility: CLINIC | Age: 60
Setting detail: DERMATOLOGY
End: 2025-03-25

## 2025-03-25 DIAGNOSIS — Z41.9 ENCOUNTER FOR PROCEDURE FOR PURPOSES OTHER THAN REMEDYING HEALTH STATE, UNSPECIFIED: ICD-10-CM

## 2025-03-25 PROCEDURE — ? FILLERS

## 2025-03-25 PROCEDURE — ? DYSPORT

## 2025-03-25 NOTE — PROCEDURE: DYSPORT
Glabellar Complex Units: 0
Show Anterior Platysmal Band Units: Yes
Additional Area 3 Location: Perioral
Dilution (U/0.1 Cc): 10
Show Right And Left Brow Units: No
Detail Level: Zone
Price (Use Numbers Only, No Special Characters Or $): 801
Expiration Date (Month Year): 7-31-26
Additional Area 1 Location: face
Consent: Written consent obtained. Risks include but not limited to lid/brow ptosis, bruising, swelling, diplopia, temporary effect, incomplete chemical denervation.
Post-Care Instructions: Patient instructed to not lie down for 4 hours and limit physical activity for 24 hours. Patient instructed not to travel by airplane for 48 hours.
Additional Area 1 Units: 159
Additional Area 2 Location: neck
Lot #: [id number]

## 2025-03-25 NOTE — PROCEDURE: FILLERS
Mid Face Filler Volume In Cc: 0
Additional Area 2 Location: Hands
Expiration Date (Month Year): 5-21-27
Include Documentation That Aspiration Was Performed Prior To Injecting Filler:: No
Include Cannula Length?: 1.5 inch
Map Statment: See Attach Map for Complete Details
Cheeks Filler Volume In Cc: 1
Filler: RHA 3
Aspiration Statement: Aspiration was performed prior to injecting site with filler.
Additional Area 3 Location: chin
Lot #: [id number]
Anesthesia Type: 2% lidocaine with epinephrine and a 1:12 solution of 8.4% sodium bicarbonate
Additional Area 1 Volume In Cc: 0.7
Filler Comments: Patient has a lot of scar tissue left cheek, very difficult to get through with the cannula, so had to place small aliquots with needle injection.
Include Cannula Size?: 25G
Additional Area 1 Location: Face
Filler: RHA 2
Additional Area 2 Location: ear lobes
Expiration Date (Month Year): 1/13/27
Lot #: [id number]
Anesthesia Volume In Cc: 0.8
Include Cannula Information In Note?: Yes
Expiration Date (Month Year): 3-31-27
Topical Anesthesia?: 2.5% lidocaine, 2.5% prilocaine
Consent: Written consent obtained. Risks include but not limited to bruising, beading, irregular texture, ulceration, infection, allergic reaction, scar formation, incomplete augmentation, temporary nature, procedural pain.
Detail Level: Zone
Post-Care Instructions: Patient instructed to apply ice to reduce swelling.
Price (Use Numbers Only, No Special Characters Or $): 2454
Vermilion Lips Filler Volume In Cc: 0.3
Filler: Antonio Devine
Lot #: [id number]

## 2025-08-01 ENCOUNTER — APPOINTMENT (OUTPATIENT)
Dept: URBAN - METROPOLITAN AREA CLINIC 173 | Facility: CLINIC | Age: 60
Setting detail: DERMATOLOGY
End: 2025-08-01

## 2025-08-01 DIAGNOSIS — Z41.9 ENCOUNTER FOR PROCEDURE FOR PURPOSES OTHER THAN REMEDYING HEALTH STATE, UNSPECIFIED: ICD-10-CM

## 2025-08-01 PROCEDURE — ? DYSPORT

## 2025-08-01 PROCEDURE — ? OTHER (COSMETIC)
